# Patient Record
Sex: MALE | Race: BLACK OR AFRICAN AMERICAN | NOT HISPANIC OR LATINO | URBAN - METROPOLITAN AREA
[De-identification: names, ages, dates, MRNs, and addresses within clinical notes are randomized per-mention and may not be internally consistent; named-entity substitution may affect disease eponyms.]

---

## 2017-08-04 ENCOUNTER — HISTORICAL (OUTPATIENT)
Dept: LAB | Facility: HOSPITAL | Age: 72
End: 2017-08-04

## 2017-08-04 LAB
ABS NEUT (OLG): 3.2 X10(3)/MCL (ref 2.1–9.2)
ALBUMIN SERPL-MCNC: 3.6 GM/DL (ref 3.4–5)
ALBUMIN/GLOB SERPL: 0.9 RATIO (ref 1.1–2)
ALP SERPL-CCNC: 86 UNIT/L (ref 50–136)
ALT SERPL-CCNC: 31 UNIT/L (ref 12–78)
AST SERPL-CCNC: 26 UNIT/L (ref 15–37)
BASOPHILS # BLD AUTO: 0 X10(3)/MCL (ref 0–0.2)
BASOPHILS NFR BLD AUTO: 0 %
BILIRUB SERPL-MCNC: 0.5 MG/DL (ref 0.2–1)
BILIRUBIN DIRECT+TOT PNL SERPL-MCNC: 0.2 MG/DL (ref 0–0.5)
BILIRUBIN DIRECT+TOT PNL SERPL-MCNC: 0.3 MG/DL (ref 0–0.8)
BUN SERPL-MCNC: 16 MG/DL (ref 7–18)
CALCIUM SERPL-MCNC: 9.5 MG/DL (ref 8.5–10.1)
CHLORIDE SERPL-SCNC: 103 MMOL/L (ref 98–107)
CO2 SERPL-SCNC: 28 MMOL/L (ref 21–32)
CREAT SERPL-MCNC: 1.44 MG/DL (ref 0.7–1.3)
EOSINOPHIL # BLD AUTO: 0.1 X10(3)/MCL (ref 0–0.9)
EOSINOPHIL NFR BLD AUTO: 1 %
ERYTHROCYTE [DISTWIDTH] IN BLOOD BY AUTOMATED COUNT: 15.9 % (ref 11.5–17)
GLOBULIN SER-MCNC: 4.1 GM/DL (ref 2.4–3.5)
GLUCOSE SERPL-MCNC: 136 MG/DL (ref 74–106)
HCT VFR BLD AUTO: 51.9 % (ref 42–52)
HGB BLD-MCNC: 15.8 GM/DL (ref 14–18)
LYMPHOCYTES # BLD AUTO: 2.9 X10(3)/MCL (ref 0.6–4.6)
LYMPHOCYTES NFR BLD AUTO: 42 %
MAGNESIUM SERPL-MCNC: 2.3 MG/DL (ref 1.8–2.4)
MCH RBC QN AUTO: 25.2 PG (ref 27–31)
MCHC RBC AUTO-ENTMCNC: 30.4 GM/DL (ref 33–36)
MCV RBC AUTO: 82.6 FL (ref 80–94)
MONOCYTES # BLD AUTO: 0.6 X10(3)/MCL (ref 0.1–1.3)
MONOCYTES NFR BLD AUTO: 10 %
NEUTROPHILS # BLD AUTO: 3.2 X10(3)/MCL (ref 2.1–9.2)
NEUTROPHILS NFR BLD AUTO: 47 %
PLATELET # BLD AUTO: 236 X10(3)/MCL (ref 130–400)
PMV BLD AUTO: 10.6 FL (ref 9.4–12.4)
POTASSIUM SERPL-SCNC: 5 MMOL/L (ref 3.5–5.1)
PROT SERPL-MCNC: 7.7 GM/DL (ref 6.4–8.2)
RBC # BLD AUTO: 6.28 X10(6)/MCL (ref 4.7–6.1)
SODIUM SERPL-SCNC: 143 MMOL/L (ref 136–145)
T4 FREE SERPL-MCNC: 1.1 NG/DL (ref 0.76–1.46)
TSH SERPL-ACNC: 2.29 MIU/ML (ref 0.36–3.74)
WBC # SPEC AUTO: 6.9 X10(3)/MCL (ref 4.5–11.5)

## 2017-08-07 ENCOUNTER — HISTORICAL (OUTPATIENT)
Dept: ADMINISTRATIVE | Facility: HOSPITAL | Age: 72
End: 2017-08-07

## 2017-09-29 ENCOUNTER — HISTORICAL (OUTPATIENT)
Dept: LAB | Facility: HOSPITAL | Age: 72
End: 2017-09-29

## 2017-09-29 LAB
ABS NEUT (OLG): 3.1 X10(3)/MCL (ref 2.1–9.2)
APTT PPP: 34.6 SECOND(S) (ref 24.8–36.9)
BASOPHILS # BLD AUTO: 0 X10(3)/MCL (ref 0–0.2)
BASOPHILS NFR BLD AUTO: 0 %
BUN SERPL-MCNC: 19 MG/DL (ref 7–18)
CALCIUM SERPL-MCNC: 9.3 MG/DL (ref 8.5–10.1)
CHLORIDE SERPL-SCNC: 102 MMOL/L (ref 98–107)
CO2 SERPL-SCNC: 33 MMOL/L (ref 21–32)
CREAT SERPL-MCNC: 1.37 MG/DL (ref 0.7–1.3)
EOSINOPHIL # BLD AUTO: 0.1 X10(3)/MCL (ref 0–0.9)
EOSINOPHIL NFR BLD AUTO: 1 %
ERYTHROCYTE [DISTWIDTH] IN BLOOD BY AUTOMATED COUNT: 15.9 % (ref 11.5–17)
GLUCOSE SERPL-MCNC: 89 MG/DL (ref 74–106)
HCT VFR BLD AUTO: 47.7 % (ref 42–52)
HGB BLD-MCNC: 15.2 GM/DL (ref 14–18)
INR PPP: 1.15 (ref 0–1.27)
LYMPHOCYTES # BLD AUTO: 2.9 X10(3)/MCL (ref 0.6–4.6)
LYMPHOCYTES NFR BLD AUTO: 42 %
MCH RBC QN AUTO: 26.9 PG (ref 27–31)
MCHC RBC AUTO-ENTMCNC: 31.9 GM/DL (ref 33–36)
MCV RBC AUTO: 84.3 FL (ref 80–94)
MONOCYTES # BLD AUTO: 0.8 X10(3)/MCL (ref 0.1–1.3)
MONOCYTES NFR BLD AUTO: 11 %
NEUTROPHILS # BLD AUTO: 3.1 X10(3)/MCL (ref 1.4–7.9)
NEUTROPHILS NFR BLD AUTO: 45 %
PLATELET # BLD AUTO: 230 X10(3)/MCL (ref 130–400)
PMV BLD AUTO: 9.7 FL (ref 9.4–12.4)
POTASSIUM SERPL-SCNC: 4.5 MMOL/L (ref 3.5–5.1)
PROTHROMBIN TIME: 14.5 SECOND(S) (ref 12.2–14.7)
RBC # BLD AUTO: 5.66 X10(6)/MCL (ref 4.7–6.1)
SODIUM SERPL-SCNC: 140 MMOL/L (ref 136–145)
WBC # SPEC AUTO: 6.9 X10(3)/MCL (ref 4.5–11.5)

## 2017-10-02 ENCOUNTER — HISTORICAL (OUTPATIENT)
Dept: CARDIOLOGY | Facility: HOSPITAL | Age: 72
End: 2017-10-02

## 2018-12-03 ENCOUNTER — HOSPITAL ENCOUNTER (OUTPATIENT)
Dept: MEDSURG UNIT | Facility: HOSPITAL | Age: 73
End: 2018-12-04
Attending: INTERNAL MEDICINE | Admitting: INTERNAL MEDICINE

## 2018-12-03 LAB
ABS NEUT (OLG): 3.5 X10(3)/MCL (ref 2.1–9.2)
APTT PPP: 40.5 SECOND(S) (ref 24.8–36.9)
BASOPHILS # BLD AUTO: 0 X10(3)/MCL (ref 0–0.2)
BASOPHILS NFR BLD AUTO: 0 %
BUN SERPL-MCNC: 14 MG/DL (ref 7–18)
CALCIUM SERPL-MCNC: 9.2 MG/DL (ref 8.5–10.1)
CHLORIDE SERPL-SCNC: 103 MMOL/L (ref 98–107)
CO2 SERPL-SCNC: 31 MMOL/L (ref 21–32)
CREAT SERPL-MCNC: 1.32 MG/DL (ref 0.7–1.3)
CREAT/UREA NIT SERPL: 10.6
EOSINOPHIL # BLD AUTO: 0.2 X10(3)/MCL (ref 0–0.9)
EOSINOPHIL NFR BLD AUTO: 2 %
ERYTHROCYTE [DISTWIDTH] IN BLOOD BY AUTOMATED COUNT: 14.4 % (ref 11.5–17)
GLUCOSE SERPL-MCNC: 84 MG/DL (ref 74–106)
HCT VFR BLD AUTO: 39.7 % (ref 42–52)
HGB BLD-MCNC: 12.1 GM/DL (ref 14–18)
INR PPP: 1 (ref 0–1.3)
LYMPHOCYTES # BLD AUTO: 3.7 X10(3)/MCL (ref 0.6–4.6)
LYMPHOCYTES NFR BLD AUTO: 45 %
MAGNESIUM SERPL-MCNC: 2.2 MG/DL (ref 1.8–2.4)
MCH RBC QN AUTO: 26.4 PG (ref 27–31)
MCHC RBC AUTO-ENTMCNC: 30.5 GM/DL (ref 33–36)
MCV RBC AUTO: 86.7 FL (ref 80–94)
MONOCYTES # BLD AUTO: 0.8 X10(3)/MCL (ref 0.1–1.3)
MONOCYTES NFR BLD AUTO: 9 %
NEUTROPHILS # BLD AUTO: 3.5 X10(3)/MCL (ref 2.1–9.2)
NEUTROPHILS NFR BLD AUTO: 42 %
PLATELET # BLD AUTO: 229 X10(3)/MCL (ref 130–400)
PMV BLD AUTO: 10 FL (ref 9.4–12.4)
POTASSIUM SERPL-SCNC: 5.1 MMOL/L (ref 3.5–5.1)
PROTHROMBIN TIME: 13.4 SECOND(S) (ref 12.2–14.7)
RBC # BLD AUTO: 4.58 X10(6)/MCL (ref 4.7–6.1)
SODIUM SERPL-SCNC: 142 MMOL/L (ref 136–145)
WBC # SPEC AUTO: 8.2 X10(3)/MCL (ref 4.5–11.5)

## 2018-12-04 LAB
ABS NEUT (OLG): 6.45 X10(3)/MCL (ref 2.1–9.2)
ALBUMIN SERPL-MCNC: 3.2 GM/DL (ref 3.4–5)
ALBUMIN/GLOB SERPL: 1.1 RATIO (ref 1.1–2)
ALP SERPL-CCNC: 55 UNIT/L (ref 50–136)
ALT SERPL-CCNC: 29 UNIT/L (ref 12–78)
AST SERPL-CCNC: 45 UNIT/L (ref 15–37)
BASOPHILS # BLD AUTO: 0 X10(3)/MCL (ref 0–0.2)
BASOPHILS NFR BLD AUTO: 0 %
BILIRUB SERPL-MCNC: 0.3 MG/DL (ref 0.2–1)
BILIRUBIN DIRECT+TOT PNL SERPL-MCNC: 0.1 MG/DL (ref 0–0.5)
BILIRUBIN DIRECT+TOT PNL SERPL-MCNC: 0.2 MG/DL (ref 0–0.8)
BUN SERPL-MCNC: 16 MG/DL (ref 7–18)
CALCIUM SERPL-MCNC: 7.7 MG/DL (ref 8.5–10.1)
CHLORIDE SERPL-SCNC: 104 MMOL/L (ref 98–107)
CO2 SERPL-SCNC: 31 MMOL/L (ref 21–32)
CREAT SERPL-MCNC: 1.37 MG/DL (ref 0.7–1.3)
EOSINOPHIL # BLD AUTO: 0 X10(3)/MCL (ref 0–0.9)
EOSINOPHIL NFR BLD AUTO: 0 %
ERYTHROCYTE [DISTWIDTH] IN BLOOD BY AUTOMATED COUNT: 14.5 % (ref 11.5–17)
GLOBULIN SER-MCNC: 3 GM/DL (ref 2.4–3.5)
GLUCOSE SERPL-MCNC: 102 MG/DL (ref 74–106)
HCT VFR BLD AUTO: 35 % (ref 42–52)
HGB BLD-MCNC: 11 GM/DL (ref 14–18)
LYMPHOCYTES # BLD AUTO: 2 X10(3)/MCL (ref 0.6–4.6)
LYMPHOCYTES NFR BLD AUTO: 21 %
MCH RBC QN AUTO: 26.4 PG (ref 27–31)
MCHC RBC AUTO-ENTMCNC: 31.4 GM/DL (ref 33–36)
MCV RBC AUTO: 83.9 FL (ref 80–94)
MONOCYTES # BLD AUTO: 1 X10(3)/MCL (ref 0.1–1.3)
MONOCYTES NFR BLD AUTO: 10 %
NEUTROPHILS # BLD AUTO: 6.45 X10(3)/MCL (ref 2.1–9.2)
NEUTROPHILS NFR BLD AUTO: 68 %
PLATELET # BLD AUTO: 200 X10(3)/MCL (ref 130–400)
PMV BLD AUTO: 9.9 FL (ref 9.4–12.4)
POTASSIUM SERPL-SCNC: 4.4 MMOL/L (ref 3.5–5.1)
PROT SERPL-MCNC: 6.2 GM/DL (ref 6.4–8.2)
RBC # BLD AUTO: 4.17 X10(6)/MCL (ref 4.7–6.1)
SODIUM SERPL-SCNC: 140 MMOL/L (ref 136–145)
WBC # SPEC AUTO: 9.5 X10(3)/MCL (ref 4.5–11.5)

## 2019-07-23 ENCOUNTER — HISTORICAL (OUTPATIENT)
Dept: ADMINISTRATIVE | Facility: HOSPITAL | Age: 74
End: 2019-07-23

## 2019-07-29 LAB
FINAL CULTURE: NORMAL
FINAL CULTURE: NORMAL

## 2019-09-01 ENCOUNTER — HOSPITAL ENCOUNTER (OUTPATIENT)
Dept: TELEMEDICINE | Facility: HOSPITAL | Age: 74
Discharge: HOME | End: 2019-09-01
Payer: MEDICARE

## 2019-09-01 DIAGNOSIS — G45.9 TIA DUE TO EMBOLISM: ICD-10-CM

## 2019-09-01 DIAGNOSIS — I74.9 TIA DUE TO EMBOLISM: ICD-10-CM

## 2019-09-01 PROCEDURE — G0425 INPT/ED TELECONSULT30: HCPCS | Mod: GT,G0,, | Performed by: PSYCHIATRY & NEUROLOGY

## 2019-09-01 PROCEDURE — G0425 PR INPT TELEHEALTH CONSULT 30M: ICD-10-PCS | Mod: GT,G0,, | Performed by: PSYCHIATRY & NEUROLOGY

## 2019-09-01 NOTE — HPI
74 year old presents with transient hemiparesis and aphasia. Now resolved  PM Hx HTN. CHF, AICD, AFIB. Exam and CT head normal

## 2019-09-01 NOTE — SUBJECTIVE & OBJECTIVE
Woke up with symptoms?: no    Recent bleeding noted: no  Does the patient take any Blood Thinners? yes  Medications: Anticoagulants:  apixaban/Eliquis      Past Medical History: hypertension, CHF and Afib    Past Surgical History: no major surgeries within the last 2 weeks    Family History: MI/CAD and stroke    Social History: no smoking, no drinking, no drugs    Allergies: Allergies have not been reviewed No relevant allergies    Review of Systems   Neurological: Positive for speech difficulty, weakness and numbness.   All other systems reviewed and are negative.    Objective:        CT READ: Yes  No hemmorhage. No mass effect. No early infarct signs.     Physical Exam   Constitutional: He appears well-developed.   HENT:   Head: Normocephalic.   Eyes: Pupils are equal, round, and reactive to light.   Neck: Normal range of motion.   Cardiovascular: Normal rate.   Pulmonary/Chest: Effort normal.   Abdominal: Soft.   Musculoskeletal: Normal range of motion.   Neurological: He is alert.   Skin: Skin is warm.

## 2019-09-01 NOTE — ASSESSMENT & PLAN NOTE
SEE HPI    TIA emblic to L MCA    Antithrombotics for secondary stroke prevention: Anticoagulants: Apixaban 5 mg BID     Statins for secondary stroke prevention and hyperlipidemia, if present:   Statins: Atorvastatin- 80 mg daily    Aggressive risk factor modification: HTN, A-Fib     Rehab efforts: The patient has been evaluated by a stroke team provider and the therapy needs have been fully considered based off the presenting complaints and exam findings. The following therapy evaluations are needed: None    Diagnostics ordered/pending: Carotid ultrasound to assess vasculature, TTE to assess cardiac function/status     VTE prophylaxis: None: Reason for No Pharmacological VTE Prophylaxis: Currently on anticoagulation    BP parameters: TIA: SBP <220 until imaging confirmation of no infarct

## 2019-09-01 NOTE — CONSULTS
[facility]  Vascular Neurology  Comprehensive Stroke Center  Tele-Consultation Note      Consults    Consulting Provider: CRISTO REYES  Current Providers  No providers found    Patient [facility]  Spoke hospital nurse at bedside with patient assisting consultant.     Patient information was obtained from patient.         Assessment/Plan:     STROKE DOCUMENTATION     Acute Stroke Times:   Acute Stroke Times   Last Known Normal Date: 09/01/19  Last Known Normal Time: 0615  Stroke Team Arrival Date: 09/01/19  Stroke Team Arrival Time: 0818  CT Interpretation Time: 0825    NIH Scale:  1a. Level of Consciousness: 0-->Alert, keenly responsive  1b. LOC Questions: 0-->Answers both questions correctly  1c. LOC Commands: 0-->Performs both tasks correctly  2. Best Gaze: 0-->Normal  3. Visual: 0-->No visual loss  4. Facial Palsy: 0-->Normal symmetrical movements  5a. Motor Arm, Left: 0-->No drift, limb holds 90 (or 45) degrees for full 10 secs  5b. Motor Arm, Right: 0-->No drift, limb holds 90 (or 45) degrees for full 10 secs  6a. Motor Leg, Left: 0-->No drift, leg holds 30 degree position for full 5 secs  6b. Motor Leg, Right: 0-->No drift, leg holds 30 degree position for full 5 secs  7. Limb Ataxia: 0-->Absent  8. Sensory: 0-->Normal, no sensory loss  9. Best Language: 0-->No aphasia, normal  10. Dysarthria: 0-->Normal  11. Extinction and Inattention (formerly Neglect): 0-->No abnormality  Total (NIH Stroke Scale): 0     Modified [place]    Matt Coma Scale:    ABCD2 Score:    [id number] Score:   HAS -BLED Score:   ICH Score:   Hunt & Cramer Classification:       Diagnoses:   TIA due to embolism  SEE HPI    TIA emblic to L MCA    Antithrombotics for secondary stroke prevention: Anticoagulants: Apixaban 5 mg BID     Statins for secondary stroke prevention and hyperlipidemia, if present:   Statins:  Atorvastatin- 80 mg daily    Aggressive risk factor modification: HTN, A-Fib     Rehab efforts: The patient has been evaluated by a stroke team provider and the therapy needs have been fully considered based off the presenting complaints and exam findings. The following therapy evaluations are needed: None    Diagnostics ordered/pending: Carotid ultrasound to assess vasculature, TTE to assess cardiac function/status     VTE prophylaxis: None: Reason for No Pharmacological VTE Prophylaxis: Currently on anticoagulation    BP parameters: TIA: SBP <220 until imaging confirmation of no infarct             There were no vitals taken for this visit.  Alteplase Eligible?: No  Alteplase Recommendation: Alteplase not recommended due to Symptoms resolved   Possible Interventional Revascularization Candidate? No; No significant neurological deficit    Disposition Recommendation: admit to inpatient  do not transfer    Subjective:     History of Present Illness:  74 year old presents with transient hemiparesis and aphasia. Now resolved  PM Hx HTN. CHF, AICD, AFIB. Exam and CT head normal      Woke up with symptoms?: no    Recent bleeding noted: no  Does the patient take any Blood Thinners? yes  Medications: Anticoagulants:  apixaban/Eliquis      Past Medical History: hypertension, CHF and Afib    Past Surgical History: no major surgeries within the last 2 weeks    Family History: MI/CAD and stroke    Social History: no smoking, no drinking, no drugs    Allergies: Allergies have not been reviewed No relevant allergies    Review of Systems   Neurological: Positive for speech difficulty, weakness and numbness.   All other systems reviewed and are negative.    Objective:        CT READ: Yes  No hemmorhage. No mass effect. No early infarct signs.     Physical Exam   Constitutional: He appears well-developed.   HENT:   Head: Normocephalic.   Eyes: Pupils are equal, round, and reactive to light.   Neck: Normal range of motion.    Cardiovascular: Normal rate.   Pulmonary/Chest: Effort normal.   Abdominal: Soft.   Musculoskeletal: Normal range of motion.   Neurological: He is alert.   Skin: Skin is warm.             Recommended the emergency room physician to have a brief discussion with the patient and/or family if available regarding the risks and benefits of treatment, and to briefly document the occurrence of that discussion in his clinical encounter note.     The attending portion of this evaluation, treatment, and documentation was performed per Kwabena Street MD via audiovisual.    Billing code:  (non-intervention mild to moderate stroke, TIA, some mimics)    · This patient has a critical neurological condition/illness, with some potential for high morbidity and mortality.  · There is a moderate probability for acute neurological change leading to clinical and possibly life-threatening deterioration requiring highest level of physician preparedness for urgent intervention.  · Care was coordinated with other physicians involved in the patient's care.  · Radiologic studies and laboratory data were reviewed and interpreted, and plan of care was re-assessed based on the results.  · Diagnosis, treatment options and prognosis may have been discussed with the patient and/or family members or caregiver.      In your opinion, this was a: Tier 1 Van Positive    Consult End Time: 8:33 AM     Kwabena Street MD  Comprehensive Stroke Center  Vascular Neurology   [facility]

## 2022-04-29 NOTE — OP NOTE
Patient:   Darell Hassan            MRN: [id number]            FIN: [id number]               Age:   72 years     Sex:  Male     :  1945   Associated Diagnoses:   None   Author:   Puneet Larson MD      Cardiologist: Dr. Puneet Larson    Assistant: _    Preoperative Diagnosis(es):  [ X ] Artial fibrillation  [ _ ] Artial flutter  [ _ ] Artial tachycardia    Postoperative Diagnosis(es):  [ X ] Normal Sinus rhythm  [ _ ] Sinus bradycardia  [ _ ] Artial fibrillation  [ _ ] Sinus with third-degree atrioventricular block  [ _ ] A-V sequential pacing/capture    Procedure(s):  Electrical cardioversion    Complications:  None    Description of Procedure:  After informed consent was obtained and permit signed, the patient was transported to the Cardiac Electrophysiology Laboratory. The EKG electrodes and cardioverter / defibrillator pads were applied to the patient. Pulse oximetry and ventilation were monitored, with oxygen and ventilation assistance given as needed. Adequate sedation for the procedure was accomplished by [X]the Anesthsia service/[_] IV Versend and Fentanyl. Synchronized direct-current energy was delivered at a level of 200 joules biphasic. Additional energy was [X] not indicated/[_] indicated at _ joules biphasic. Sinus rhythm[X]was/[_] was not restored. Upon awakening, the patient was transferred in a stable condition to a monitored bed for recovery.

## 2022-04-29 NOTE — OP NOTE
Patient:   Darell Hassan            MRN: [id number]            FIN: [id number]               Age:   73 years     Sex:  Male     :  1945   Associated Diagnoses:   None   Author:   Puneet Larson MD      Operative Note   Operative Information   Date/ Time:  12/3/2018 15:34:00.     Procedures Performed:   1.  Comprehensive EP study with attempted induction of arrhythmia  2.  Left atrial pacing and recording from coronary sinus catheter  3.  Programmed stimulation and pacing after IV drug infusion  4.  Pulmonary vein isolation and substrate modification for persistent atrial for ablation  5.  Empiric ablation of the caval tricuspid isthmus for typical appearing atrial flutter  6.  Intracardiac echo  7.  Transseptal puncture  8.  3D mapping  .     Indications:   73-year-old black male with history of persistent atrial fibrillation as well as typical appearing atrial flutter who is undergoing EP study and ablation for attempted definitive therapy.  .     Preoperative Diagnosis:   1.  Persistent atrial fibrillation  2.  Typical appearing atrial flutter  .     Postoperative Diagnosis:     1.  Persistent atrial fibrillation  2.  Typical appearing atrial flutter  .     Surgeon: Puneet Larson MD.     Esimated blood loss: loss less than  10  cc.     Description of Procedure/Findings/    Complications:   Summary of procedure:  After risks, benefits, and alternatives were explained to the patient, informed consent was obtained.  The patient was brought to the EP lab in a fasting nonsedated state.  Sedation for the procedure was accomplished by the anesthesia team.  The bilateral groins were prepped and draped in usual sterile fashion.  Using 1% lidocaine the bilateral groins were anesthetized.  Using the modified Seldinger technique access was obtained to the right femoral vein on 3 separate occasions where a 7 Cuban sheath, a 8 Cuban sheath, and the SLO sheath were placed.  Again using the modified Seldinger  technique access was obtained to left femoral vein on a single occasion where a long 10 Macanese sheath was placed.  I then advanced a [employer] decapolar CS catheter through the 7 Macanese sheath and placed into the coronary sinus for left atrial pacing and recording.  A CRD 2 catheter was advanced through the 8 Macanese sheath and placed in the His bundle region for His bundle recording.  Baseline measurements were then obtained.  I then advanced a [place] transseptal needle through the SLO sheath and dilator and under fluoroscopic and intracardiac echo guidance a single transseptal puncture was performed with a single radiofrequency impulse.  Successful puncture was confirmed on intracardiac echo.  The SLO sheath was advanced over the needle and dilator into left atrium and the needle and dilator were removed from the body.  A left atrial mean pressure of 16 mmHg was measured.    Of note, after all venous access had been obtained the patient received a 5000 unit heparin bolus.  After successful transseptal puncture the patient received a 6000 unit heparin bolus and was started on heparin drip.  Heparin was titrated throughout the procedure for a goal ACT of 300-400 seconds.    A [place] pigtail wire was advanced into left atrium and the SLO sheath was exchanged for a short 8 Macanese sheath.  The CRD 2 catheter was removed the body and the previously placed 8 Macanese sheath was exchanged for a SLO sheath.  Again under fluoroscopic and intracardiac echo guidance a second transseptal puncture was performed again using a single radiofrequency impulse.  Successful puncture was confirmed on intracardiac echo.  The SLO sheath was advanced over the needle and dilator into the left atrium and the needle and dilator were removed from the body.  The short 8 Macanese sheath was then exchanged for a Agilis searable  sheath.  A circular mapping catheter was then advanced through the SLO sheath into the left atrium and anatomy and  voltage mapping was created of the left atrium, pulmonary veins, and left atrial appendage.  The mapping catheter was then placed into the left superior pulmonary vein.  I then advanced a [place] Tachy-catheter through the steerable sheath into the left atrium.  I then proceeded with wide area circumferential ablation of the left-sided pulmonary veins.  Due to continued conduction with pacing from the proximal coronary sinus into the left superior pulmonary vein I decided with ablation within the gume of the 2 left-sided veins.  When ablating in the gume isolation of the left superior pulmonary vein was achieved.  The mapping catheter was then advanced into the left inferior pulmonary vein which demonstrated continued conduction.  With further ablation within the gume and more proximal aspect to the left inferior pulmonary vein, isolation was achieved.    The circular mapping catheter was then directed to the right superior pulmonary vein.  I then proceeded with wide area circumferential ablation of the right-sided pulmonary veins.  Once the right superior pulmonary vein was isolated, the circular mapping catheter was then directed into the right inferior pulmonary vein which was also found to be isolated.    With the circular mapping catheter in the right inferior pulmonary vein the patient was then given 18 mg of IV adenosine to hyper-depolarize the atrial tissue and evaluate for reconnection of the pulmonary vein.  No reconnection was found.  This adenosine challenge was also performed for the other 3 pulmonary veins which remained isolated.  I then directed the circular mapping catheter to the left atrial posterior wall and proceeded with isolation of the left atrial posterior wall for further substrate modification of the left atrium.  Once the posterior wall was isolated the circular mapping catheter and SLO sheath were withdrawn into the inferior vena cava.  The circular mapping catheter was then  exchanged for a duo decapolar Halo catheter which was advanced into the right atrium and placed along the neelam terminalis for right atrial pacing and recording.  The steerable sheath and ablation catheter were then withdrawn into the inferior vena cava.  The heparin drip was then discontinued.  I then performed pacing from the proximal coronary sinus and distal halo catheters to evaluate for conduction across the isthmus.    The ablation catheter was then placed at the distal aspect of the caval tricuspid isthmus in the 6 o'clock position and while performing pacing from the proximal coronary sinus, ablation through the isthmus was performed.  When ablating at the proximal aspect of the isthmus block was achieved.  This line was completed to the inferior vena cava.  The ablation catheter was placed back at the distal aspect of this previous line and this line was traced with the ablation catheter performing ablation in areas where there were adequate atrial signals.    Bidirectional block was then confirmed.  The ablation catheter was then exchanged for the CRD 2 catheter which was placed back in the His bundle region.  Post ablation baseline measurements were obtained.  I then performed the post ablation atrial study with atrial burst pacing, decremental pacing, and extra stimuli.  After 20 minutes from my last ablation lesion the patient was again given 18 mg of IV adenosine to hyper-depolarize the atrial tissue and evaluate for continued bidirectional block.  Bidirectional block persisted without reconnection of conduction through the isthmus.    The intracardiac echo catheter was then advanced into the right ventricle to visualize for pericardial effusion.  There is no significant pericardial effusion found.  The catheters were then removed from the body and the sheaths were flushed.  The SLO sheath and the steerable sheath were exchanged for short 9 Chinese sheath.  The long 10 Chinese sheath was exchanged for  a short 10 Indonesian sheath.  The patient was then given 60 mg of IV protamine for reversal of heparinization.    The patient be transferred to the postanesthesia care unit where the sheath will be removed and hemostasis will be obtained with manual compression once the ACT is less than 180 seconds.    3D mapping was used throughout the procedure to create anatomy of the left atrium, pulmonary veins, and left atrial appendage as well as to blas the location of the catheters as well as ablation lesions.    Baseline measurements:  Sinus node: The sinus cycle length was 1055 ms  AV node: The KY interval was 207 ms, the AH interval was 152 ms, the HV interval 58 ms  Ventricle: QRS duration was 92 ms and the QT was 422 ms    Ablation: I performed a total number of 175 ablation lesions for total time of 70 minutes and 56 seconds the average temperature was 31 °C and average power was 20 W.  Ablation lesions 1 through 135 were used to perform pulmonary vein isolation.  Ablation lesions #135 through 150 were used to perform ablation of the left atrial posterior wall with decreased power output of 20 W.  Ablation lesions #151 through 175 were used to perform ablation of the caval tricuspid isthmus.    Post ablation measurements:  Sinus node: The patient was being atrially paced at 1000 ms.  AV node: The KY interval was 235 ms, the AH interval was 102 ms, the HV interval was 60 ms  Ventricle: QRS duration was 86 ms and the QT was 432 ms    Atrial study: The AV block cycle length was found at 480 ms.  The AV node ERP was found at 370 ms of a drive train of 600 ms.  The atrial ERP was found at 220 ms after drive train of 600 ms.    Trans-isthmus conduction: The pre-ablation trans-isthmus conduction time with pacing from the proximal coronary sinus to the distal Halo catheter and with pacing from the distal Halo catheter to the proximal coronary sinus was 105 ms.  The post ablation trans-isthmus conduction time with pacing from the  proximal coronary sinus to the distal Halo catheter and with pacing from the distal Halo catheter to the proximal coronary sinus was 135 ms.  There was positive differential pacing as when I paced from halo electrodes 3, 4 the trans-isthmus conduction time to the proximal coronary sinus was 120 ms.    Impression:  1.  Successful pulmonary vein isolation with left atrial substrate modification for persistent atrial fibrillation  2.  Successful ablation of the caval tricuspid isthmus with achievement of bidirectional block for typical appearing atrial flutter    Plan:  We will monitor patient overnight for complications from the procedure.  He will receive half dose Lovenox 8 hours after hemostasis obtained.  I will attempt to stop antiarrhythmic drug therapy.  We will attempt to restart full anti-correlation therapy tomorrow morning.  .     Complications: None.

## 2023-09-18 ENCOUNTER — ANESTHESIA EVENT (OUTPATIENT)
Dept: ENDOSCOPY | Facility: HOSPITAL | Age: 78
End: 2023-09-18
Payer: MEDICARE

## 2023-09-18 RX ORDER — ONDANSETRON 2 MG/ML
4 INJECTION INTRAMUSCULAR; INTRAVENOUS ONCE AS NEEDED
Status: CANCELLED | OUTPATIENT
Start: 2023-09-18 | End: 2035-02-14

## 2023-09-18 NOTE — ANESTHESIA PREPROCEDURE EVALUATION
09/18/2023  Darell Hassan is a 78 y.o., male , who presents for the following:    Procedure: COLON (Abdomen)   Anesthesia type: General/MAC   Diagnosis: Personal history of colonic polyps [Z86.010]   Location: [facility] ENDO 02 / [facility] ENDOSCOPY   Surgeons: Duy Aguirre MD      TTE (2022) :   - LVEF 60% / mild concentric LVH   - MASOOD   - AV sclerosis w/o stenosis   - Mod TR / trace MR   - PASP 48 mmHg    Nuclear PET (3/18/21) :    - Rest EF 52%   - Scan suggests Moderate Risks for future CV events    Pre-op Assessment    I have reviewed the Patient Summary Reports.     I have reviewed the Nursing Notes. I have reviewed the NPO Status.   I have reviewed the Medications.     Review of Systems  Anesthesia Hx:  No problems with previous Anesthesia  Denies Family Hx of Anesthesia complications.   Denies Personal Hx of Anesthesia complications.   Cardiovascular:   NICM, w/ AICD  A. Fib / PAF  Moderate TR  Pulm HTN, PASP 48 mmHG  >4 METS activity   Neurological:   TIA,    Endocrine:   Diabetes, type 2        Physical Exam  General: Alert and Oriented    Airway:  Mallampati: II   Mouth Opening: Normal  TM Distance: Normal  Tongue: Normal  Neck ROM: Normal ROM    Chest/Lungs:  Normal Respiratory Rate    Heart:  Rate: Normal  Rhythm: Regular Rhythm        Anesthesia Plan  Type of Anesthesia, risks & benefits discussed:    Anesthesia Type: Gen Natural Airway  Intra-op Monitoring Plan: Standard ASA Monitors  Post Op Pain Control Plan: IV/PO Opioids PRN  Induction:  IV  Airway Plan: Direct  Informed Consent: Informed consent signed with the Patient and all parties understand the risks and agree with anesthesia plan.  All questions answered. Patient consented to blood products? No  ASA Score: 3  Day of Surgery Review of History & Physical: H&P Update referred to the surgeon/provider.  Anesthesia Plan  Notes: Nasal cannula vs facemask supplemental oxygenation   For patients with GORDO/obesity, may consider SuperNoval Nasal CPAP      Ready For Surgery From Anesthesia Perspective.     .

## 2023-09-19 ENCOUNTER — ANESTHESIA (OUTPATIENT)
Dept: ENDOSCOPY | Facility: HOSPITAL | Age: 78
End: 2023-09-19
Payer: MEDICARE

## 2023-09-19 ENCOUNTER — HOSPITAL ENCOUNTER (OUTPATIENT)
Facility: HOSPITAL | Age: 78
Discharge: HOME | End: 2023-09-19
Attending: INTERNAL MEDICINE | Admitting: INTERNAL MEDICINE
Payer: MEDICARE

## 2023-09-19 DIAGNOSIS — Z86.010 PERSONAL HISTORY OF COLONIC POLYPS: ICD-10-CM

## 2023-09-19 LAB — POCT GLUCOSE: 81 MG/DL (ref 70–110)

## 2023-09-19 PROCEDURE — D9220A PRA ANESTHESIA: Mod: PT,ANES,, | Performed by: ANESTHESIOLOGY

## 2023-09-19 PROCEDURE — 25000003 PHARM REV CODE 250: Performed by: NURSE ANESTHETIST, CERTIFIED REGISTERED

## 2023-09-19 PROCEDURE — 82962 GLUCOSE BLOOD TEST: CPT | Performed by: INTERNAL MEDICINE

## 2023-09-19 PROCEDURE — 25000003 PHARM REV CODE 250: Performed by: ANESTHESIOLOGY

## 2023-09-19 PROCEDURE — 88305 TISSUE EXAM BY PATHOLOGIST: CPT | Performed by: INTERNAL MEDICINE

## 2023-09-19 PROCEDURE — D9220A PRA ANESTHESIA: Mod: PT,CRNA,, | Performed by: NURSE ANESTHETIST, CERTIFIED REGISTERED

## 2023-09-19 PROCEDURE — D9220A PRA ANESTHESIA: ICD-10-PCS | Mod: PT,CRNA,, | Performed by: NURSE ANESTHETIST, CERTIFIED REGISTERED

## 2023-09-19 PROCEDURE — 37000008 HC ANESTHESIA 1ST 15 MINUTES: Performed by: INTERNAL MEDICINE

## 2023-09-19 PROCEDURE — 45385 COLONOSCOPY W/LESION REMOVAL: CPT | Mod: PT | Performed by: INTERNAL MEDICINE

## 2023-09-19 PROCEDURE — 37000009 HC ANESTHESIA EA ADD 15 MINS: Performed by: INTERNAL MEDICINE

## 2023-09-19 PROCEDURE — D9220A PRA ANESTHESIA: ICD-10-PCS | Mod: PT,ANES,, | Performed by: ANESTHESIOLOGY

## 2023-09-19 PROCEDURE — 27201423 OPTIME MED/SURG SUP & DEVICES STERILE SUPPLY: Performed by: INTERNAL MEDICINE

## 2023-09-19 PROCEDURE — 63600175 PHARM REV CODE 636 W HCPCS: Performed by: NURSE ANESTHETIST, CERTIFIED REGISTERED

## 2023-09-19 RX ORDER — METFORMIN HYDROCHLORIDE 1000 MG/1
1000 TABLET ORAL 2 TIMES DAILY WITH MEALS
COMMUNITY

## 2023-09-19 RX ORDER — LIDOCAINE HYDROCHLORIDE 10 MG/ML
1 INJECTION, SOLUTION EPIDURAL; INFILTRATION; INTRACAUDAL; PERINEURAL ONCE
Status: DISCONTINUED | OUTPATIENT
Start: 2023-09-19 | End: 2023-09-19 | Stop reason: HOSPADM

## 2023-09-19 RX ORDER — HYDRALAZINE HYDROCHLORIDE 50 MG/1
50 TABLET, FILM COATED ORAL 3 TIMES DAILY
COMMUNITY

## 2023-09-19 RX ORDER — SOTALOL HYDROCHLORIDE 80 MG/1
40 TABLET ORAL 2 TIMES DAILY
COMMUNITY

## 2023-09-19 RX ORDER — PROPOFOL 10 MG/ML
VIAL (ML) INTRAVENOUS
Status: COMPLETED
Start: 2023-09-19 | End: 2023-09-19

## 2023-09-19 RX ORDER — ATORVASTATIN CALCIUM 80 MG/1
80 TABLET, FILM COATED ORAL DAILY
COMMUNITY

## 2023-09-19 RX ORDER — PHENYLEPHRINE HYDROCHLORIDE 10 MG/ML
INJECTION INTRAVENOUS
Status: DISCONTINUED | OUTPATIENT
Start: 2023-09-19 | End: 2023-09-19

## 2023-09-19 RX ORDER — EPHEDRINE SULFATE 50 MG/ML
INJECTION, SOLUTION INTRAVENOUS
Status: DISCONTINUED | OUTPATIENT
Start: 2023-09-19 | End: 2023-09-19

## 2023-09-19 RX ORDER — LIDOCAINE HYDROCHLORIDE 20 MG/ML
INJECTION, SOLUTION EPIDURAL; INFILTRATION; INTRACAUDAL; PERINEURAL
Status: DISCONTINUED
Start: 2023-09-19 | End: 2023-09-19 | Stop reason: HOSPADM

## 2023-09-19 RX ORDER — PROPOFOL 10 MG/ML
VIAL (ML) INTRAVENOUS
Status: DISCONTINUED | OUTPATIENT
Start: 2023-09-19 | End: 2023-09-19

## 2023-09-19 RX ORDER — AMLODIPINE BESYLATE 5 MG/1
5 TABLET ORAL DAILY
COMMUNITY

## 2023-09-19 RX ORDER — SODIUM CHLORIDE, SODIUM GLUCONATE, SODIUM ACETATE, POTASSIUM CHLORIDE AND MAGNESIUM CHLORIDE 30; 37; 368; 526; 502 MG/100ML; MG/100ML; MG/100ML; MG/100ML; MG/100ML
INJECTION, SOLUTION INTRAVENOUS CONTINUOUS
Status: DISCONTINUED | OUTPATIENT
Start: 2023-09-19 | End: 2023-09-19 | Stop reason: HOSPADM

## 2023-09-19 RX ORDER — SODIUM CHLORIDE, SODIUM LACTATE, POTASSIUM CHLORIDE, CALCIUM CHLORIDE 600; 310; 30; 20 MG/100ML; MG/100ML; MG/100ML; MG/100ML
INJECTION, SOLUTION INTRAVENOUS CONTINUOUS
Status: DISCONTINUED | OUTPATIENT
Start: 2023-09-19 | End: 2023-09-19 | Stop reason: HOSPADM

## 2023-09-19 RX ORDER — LISINOPRIL 40 MG/1
40 TABLET ORAL DAILY
COMMUNITY

## 2023-09-19 RX ORDER — PROPOFOL 10 MG/ML
VIAL (ML) INTRAVENOUS CONTINUOUS PRN
Status: DISCONTINUED | OUTPATIENT
Start: 2023-09-19 | End: 2023-09-19

## 2023-09-19 RX ORDER — LIDOCAINE HYDROCHLORIDE 10 MG/ML
INJECTION, SOLUTION EPIDURAL; INFILTRATION; INTRACAUDAL; PERINEURAL
Status: DISCONTINUED | OUTPATIENT
Start: 2023-09-19 | End: 2023-09-19

## 2023-09-19 RX ORDER — PHENYLEPHRINE HCL IN 0.9% NACL 1 MG/10 ML
SYRINGE (ML) INTRAVENOUS
Status: DISCONTINUED
Start: 2023-09-19 | End: 2023-09-19 | Stop reason: HOSPADM

## 2023-09-19 RX ORDER — GLYCOPYRROLATE 0.2 MG/ML
INJECTION INTRAMUSCULAR; INTRAVENOUS
Status: DISCONTINUED
Start: 2023-09-19 | End: 2023-09-19 | Stop reason: HOSPADM

## 2023-09-19 RX ORDER — METOPROLOL TARTRATE 100 MG/1
100 TABLET ORAL 2 TIMES DAILY
COMMUNITY

## 2023-09-19 RX ORDER — ASPIRIN 81 MG/1
81 TABLET ORAL DAILY
COMMUNITY

## 2023-09-19 RX ORDER — EPHEDRINE SULFATE 50 MG/ML
INJECTION, SOLUTION INTRAVENOUS
Status: DISCONTINUED
Start: 2023-09-19 | End: 2023-09-19 | Stop reason: HOSPADM

## 2023-09-19 RX ADMIN — PHENYLEPHRINE HYDROCHLORIDE 50 MCG: 10 INJECTION INTRAVENOUS at 08:09

## 2023-09-19 RX ADMIN — LIDOCAINE HYDROCHLORIDE 50 MG: 10 INJECTION, SOLUTION EPIDURAL; INFILTRATION; INTRACAUDAL; PERINEURAL at 07:09

## 2023-09-19 RX ADMIN — EPHEDRINE SULFATE 10 MG: 50 INJECTION INTRAVENOUS at 08:09

## 2023-09-19 RX ADMIN — PROPOFOL 50 MG: 10 INJECTION, EMULSION INTRAVENOUS at 07:09

## 2023-09-19 RX ADMIN — PROPOFOL 65 MCG/KG/MIN: 10 INJECTION, EMULSION INTRAVENOUS at 07:09

## 2023-09-19 RX ADMIN — SODIUM CHLORIDE, SODIUM GLUCONATE, SODIUM ACETATE, POTASSIUM CHLORIDE AND MAGNESIUM CHLORIDE: 526; 502; 368; 37; 30 INJECTION, SOLUTION INTRAVENOUS at 07:09

## 2023-09-19 NOTE — H&P
[facility]                       [address] [place] [address]    PATIENT NAME:       DEMARCUS JACKSON  YOB: 1945  CSN:                [id number]   MRN:                [id number]  ADMIT DATE:         09/19/2023 06:39:00  PHYSICIAN:          Duy Aguirre MD                        HISTORY AND PHYSICAL      HISTORY OF PRESENT ILLNESS:  78-year-old  male with a personal   history of colon polyps presents now for surveillance colonoscopy.  He denies   any melena, hematochezia, or change in bowel functions.  He has had multiple   colon polyps in the past; however, his last exam in 2018 revealed 1 small   adenomatous polyp.  A 5-year followup was recommended.    ALLERGIES:  NKDA.     MEDICATIONS:  Please see nurse's list.  The patient did discontinue his Eliquis   on Saturday.    PAST MEDICAL HISTORY:  Coronary artery disease, atrial fibrillation, diabetes,   gout, hyperlipidemia, hypertension.    PAST SURGICAL HISTORY:  Hernia repair, left toe amputation, defibrillator   placement.    SOCIAL HISTORY:  Former smoker.  No alcohol.    PHYSICAL EXAMINATION:  VITAL SIGNS:  Stable, afebrile.  GENERAL:  He is an older man in no acute distress.  Large stature.  HEENT EXAM:  Sclerae anicteric.  Conjunctiva pink.  No adenopathy or   thyromegaly.  CARDIOVASCULAR:  Regular rate and rhythm.  LUNGS:  Clear.  ABDOMEN:  Soft, nontender.  Bowel sounds normal.  No mass, organomegaly   appreciated.  EXTREMITIES:  No clubbing, cyanosis, edema.  NEUROLOGIC:  Alert and oriented.  No focal deficits.    IMPRESSION:  Personal history of colon polyps.    RECOMMENDATIONS:  We will proceed with surveillance colonoscopy.        ______________________________  MD TITI Amanda/TIARAS  DD:  09/19/2023  Time:  07:49AM  DT:  09/19/2023  Time:  08:45AM  Job #:  [id number]      HISTORY AND PHYSICAL

## 2023-09-19 NOTE — TRANSFER OF CARE
"Anesthesia Transfer of Care Note    Patient: Darell Hassan    Procedure(s) Performed: Procedure(s) (LRB):  COLON (N/A)    Patient location: GI    Anesthesia Type: general    Transport from OR: Transported from OR on room air with adequate spontaneous ventilation    Post pain: adequate analgesia    Post assessment: no apparent anesthetic complications    Post vital signs: stable    Level of consciousness: awake and sedated    Nausea/Vomiting: no nausea/vomiting    Complications: none    Transfer of care protocol was followed      Last vitals:   Visit Vitals  /72 (BP Location: Right arm, Patient Position: Lying)   Pulse 60   Temp 36.8 °C (98.2 °F) (Oral)   Resp 16   Ht 5' 11" (1.803 m)   Wt 113.4 kg (250 lb)   SpO2 97%   BMI 34.87 kg/m²     "

## 2023-09-19 NOTE — PROVATION PATIENT INSTRUCTIONS
Discharge Summary/Instructions after an Endoscopic Procedure  Patient Name: Darell Hassan  Patient MRN: [id number]  Patient YOB: 1945 Tuesday, September 19, 2023  Duy Aguirre MD  Dear patient,  As a result of recent federal legislation (The Federal Cures Act), you may   receive lab or pathology results from your procedure in your [facility]   account before your physician is able to contact you. Your physician or   their representative will relay the results to you with their   recommendations at their soonest availability.  Thank you,  RESTRICTIONS:  During your procedure today, you received medications for sedation.  These   medications may affect your judgment, balance and coordination.  Therefore,   for 24 hours, you have the following restrictions:   - DO NOT drive a car, operate machinery, make legal/financial decisions,   sign important papers or drink alcohol.    ACTIVITY:  Today: no heavy lifting, straining or running due to procedural   sedation/anesthesia.  The following day: return to full activity including work.  DIET:  Eat and drink normally unless instructed otherwise.     TREATMENT FOR COMMON SIDE EFFECTS:  - Mild abdominal pain, nausea, belching, bloating or excessive gas:  rest,   eat lightly and use a heating pad.  - Sore Throat: treat with throat lozenges and/or gargle with warm salt   water.  - Because air was used during the procedure, expelling large amounts of air   from your rectum or belching is normal.  - If a bowel prep was taken, you may not have a bowel movement for 1-3 days.    This is normal.  SYMPTOMS TO WATCH FOR AND REPORT TO YOUR PHYSICIAN:  1. Abdominal pain or bloating, other than gas cramps.  2. Chest pain.  3. Back pain.  4. Signs of infection such as: chills or fever occurring within 24 hours   after the procedure.  5. Rectal bleeding, which would show as bright red, maroon, or black stools.   (A tablespoon of blood from the rectum is not serious, especially  if   hemorrhoids are present.)  6. Vomiting.  7. Weakness or dizziness.  GO DIRECTLY TO THE NEAREST EMERGENCY ROOM IF YOU HAVE ANY OF THE FOLLOWING:      Difficulty breathing              Chills and/or fever over 101 F   Persistent vomiting and/or vomiting blood   Severe abdominal pain   Severe chest pain   Black, tarry stools   Bleeding- more than one tablespoon   Any other symptom or condition that you feel may need urgent attention  Your doctor recommends these additional instructions:  If any biopsies were taken, your doctors clinic will contact you in 1 to 2   weeks with any results.  - Discharge patient to home (ambulatory).   - Resume previous diet today.   - Await pathology results.   - Repeat colonoscopy in 3 years for surveillance based on clinical status at   that time.   - The findings and recommendations were discussed with the patient.  For questions, problems or results please call your physician - Duy Aguirre MD at Work:  [phone].  [facility] EMERGENCY ROOM PHONE NUMBER: [phone]  IF A COMPLICATION OR EMERGENCY SITUATION ARISES AND YOU ARE UNABLE TO REACH   YOUR PHYSICIAN - GO DIRECTLY TO THE EMERGENCY ROOM.  MD Duy Dougherty MD  9/19/2023 8:45:21 AM  This report has been verified and signed electronically.  Dear patient,  As a result of recent federal legislation (The Federal Cures Act), you may   receive lab or pathology results from your procedure in your [facility]   account before your physician is able to contact you. Your physician or   their representative will relay the results to you with their   recommendations at their soonest availability.  Thank you,  PROVATION   Yes

## 2023-09-19 NOTE — ANESTHESIA POSTPROCEDURE EVALUATION
Anesthesia Post Evaluation    Patient: Darell Hassan    Procedure(s) Performed: Procedure(s) (LRB):  COLON (N/A)    Final Anesthesia Type: general      Patient location during evaluation: GI PACU  Patient participation: Yes- Able to Participate  Level of consciousness: oriented and awake  Post-procedure vital signs: reviewed and stable  Pain management: adequate  Airway patency: patent    PONV status at discharge: No PONV  Anesthetic complications: no      Cardiovascular status: hemodynamically stable  Respiratory status: spontaneous ventilation and unassisted  Hydration status: euvolemic  Follow-up not needed.  Comments: [facility]          Vitals Value Taken Time   /71 09/19/23 0907   Temp 36.8 °C (98.2 °F) 09/19/23 0841   Pulse 58 09/19/23 0907   Resp 19 09/19/23 0907   SpO2 95 % 09/19/23 0907         No case tracking events are documented in the log.      Pain/Janette Score: Janette Score: 10 (9/19/2023  9:07 AM)

## 2023-09-20 VITALS
TEMPERATURE: 98 F | HEIGHT: 71 IN | HEART RATE: 58 BPM | BODY MASS INDEX: 35 KG/M2 | RESPIRATION RATE: 19 BRPM | OXYGEN SATURATION: 95 % | SYSTOLIC BLOOD PRESSURE: 120 MMHG | DIASTOLIC BLOOD PRESSURE: 71 MMHG | WEIGHT: 250 LBS

## 2023-09-20 LAB — PSYCHE PATHOLOGY RESULT: NORMAL

## 2024-01-17 ENCOUNTER — HOSPITAL ENCOUNTER (EMERGENCY)
Facility: HOSPITAL | Age: 79
Discharge: HOME | End: 2024-01-18
Attending: INTERNAL MEDICINE
Payer: MEDICARE
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
## 2024-01-17 DIAGNOSIS — S70.12XA CONTUSION OF LEFT HIP AND THIGH, INITIAL ENCOUNTER: ICD-10-CM
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
## 2024-01-17 DIAGNOSIS — S20.211A CONTUSION OF RIB ON RIGHT SIDE, INITIAL ENCOUNTER: ICD-10-CM

## 2024-01-17 DIAGNOSIS — S70.02XA CONTUSION OF LEFT HIP AND THIGH, INITIAL ENCOUNTER: ICD-10-CM

## 2024-01-17 PROCEDURE — 99284 EMERGENCY DEPT VISIT MOD MDM: CPT

## 2024-01-17 PROCEDURE — 96372 THER/PROPH/DIAG INJ SC/IM: CPT | Performed by: INTERNAL MEDICINE

## 2024-01-17 PROCEDURE — 63600175 PHARM REV CODE 636 W HCPCS: Performed by: INTERNAL MEDICINE

## 2024-01-17 RX ORDER — TRAMADOL HYDROCHLORIDE 50 MG/1
50 TABLET ORAL EVERY 8 HOURS PRN
Qty: 15 TABLET | Refills: 0 | Status: SHIPPED | OUTPATIENT
Start: 2024-01-17 | End: 2024-01-22

## 2024-01-17 RX ORDER — KETOROLAC TROMETHAMINE 30 MG/ML
60 INJECTION, SOLUTION INTRAMUSCULAR; INTRAVENOUS
Status: COMPLETED | OUTPATIENT
Start: 2024-01-17 | End: 2024-01-17

## 2024-01-17 RX ORDER — ORPHENADRINE CITRATE 30 MG/ML
60 INJECTION INTRAMUSCULAR; INTRAVENOUS
Status: COMPLETED | OUTPATIENT
Start: 2024-01-17 | End: 2024-01-17

## 2024-01-17 RX ORDER — CYCLOBENZAPRINE HCL 10 MG
10 TABLET ORAL 3 TIMES DAILY PRN
Qty: 15 TABLET | Refills: 0 | Status: SHIPPED | OUTPATIENT
Start: 2024-01-17 | End: 2024-01-22

## 2024-01-17 RX ORDER — HYDROCODONE BITARTRATE AND ACETAMINOPHEN 10; 325 MG/1; MG/1
1 TABLET ORAL
Status: COMPLETED | OUTPATIENT
Start: 2024-01-18 | End: 2024-01-17

## 2024-01-17 RX ADMIN — HYDROCODONE BITARTRATE AND ACETAMINOPHEN 1 TABLET: 10; 325 TABLET ORAL at 11:01

## 2024-01-17 RX ADMIN — KETOROLAC TROMETHAMINE 60 MG: 30 INJECTION, SOLUTION INTRAMUSCULAR at 10:01

## 2024-01-17 RX ADMIN — ORPHENADRINE CITRATE 60 MG: 60 INJECTION INTRAMUSCULAR; INTRAVENOUS at 10:01

## 2024-01-18 VITALS
HEIGHT: 71 IN | TEMPERATURE: 98 F | WEIGHT: 255 LBS | OXYGEN SATURATION: 99 % | RESPIRATION RATE: 18 BRPM | BODY MASS INDEX: 35.7 KG/M2 | DIASTOLIC BLOOD PRESSURE: 70 MMHG | HEART RATE: 72 BPM | SYSTOLIC BLOOD PRESSURE: 118 MMHG

## 2024-01-18 PROCEDURE — 25000003 PHARM REV CODE 250: Performed by: INTERNAL MEDICINE

## 2024-01-18 NOTE — ED PROVIDER NOTES
Encounter Date: 1/17/2024       History     Chief Complaint   Patient presents with    Fall     Pt reports falling Friday night around 1700, landing on a wooden step. Pt reports left lateral thigh pain and right rib pain. 2 tylenol and 2 ibuprofen around 1930 tonight. Pt denies LOC. Has defibrillator and on Eliquis.      78-year-old black male presents emergency department complaining of left hip pain and right flank pain after falling Friday night.  He took over-the-counter NSAIDs and Tylenol and states it does not seem to be getting better and he is having trouble sleeping secondary to the pain      Review of patient's allergies indicates:  No Known Allergies  Past Medical History:   Diagnosis Date    Abnormal ankle brachial index     Automatic implantable cardiac defibrillator in situ     Cardiomyopathy     Diabetes mellitus     History of cardioversion     Hypertension     Paroxysmal atrial fibrillation     Personal history of colonic polyps     Tricuspid valve insufficiency      Past Surgical History:   Procedure Laterality Date    aicd implantation      COLONOSCOPY      COLONOSCOPY, WITH POLYPECTOMY USING SNARE N/A 9/19/2023    Procedure: COLON;  Surgeon: Duy Aguirre MD;  Location: [facility] ENDOSCOPY;  Service: Gastroenterology;  Laterality: N/A;    CYST REMOVAL      DRAINAGE OF PLEURAL EFFUSION      HERNIA REPAIR      REMOVAL OF ELECTRODE LEAD OF CARDIAC CONTRACTILITY MODULATION SYSTEM       History reviewed. No pertinent family history.  Social History     Tobacco Use    Smoking status: Never    Smokeless tobacco: Never   Substance Use Topics    Alcohol use: Never     Review of Systems   Constitutional: Negative.  Negative for activity change, appetite change, chills, diaphoresis, fatigue, fever and unexpected weight change.   HENT: Negative.  Negative for congestion, dental problem, drooling, ear discharge, ear pain, facial swelling, hearing loss, mouth sores, nosebleeds, postnasal drip,  rhinorrhea, sinus pressure, sinus pain, sneezing, sore throat, tinnitus, trouble swallowing and voice change.    Eyes: Negative.  Negative for photophobia, pain, discharge, redness, itching and visual disturbance.   Respiratory: Negative.  Negative for apnea, cough, choking, chest tightness, shortness of breath, wheezing and stridor.    Cardiovascular: Negative.  Negative for chest pain, palpitations and leg swelling.   Gastrointestinal: Negative.  Negative for abdominal distention, abdominal pain, anal bleeding, blood in stool, constipation, diarrhea, nausea, rectal pain and vomiting.   Endocrine: Negative.  Negative for cold intolerance, heat intolerance, polydipsia, polyphagia and polyuria.   Genitourinary: Negative.  Negative for decreased urine volume, difficulty urinating, dysuria, enuresis, flank pain, frequency, genital sores, hematuria, penile discharge, penile pain, penile swelling, scrotal swelling, testicular pain and urgency.   Musculoskeletal:  Positive for back pain and gait problem. Negative for arthralgias, joint swelling, myalgias, neck pain and neck stiffness.   Skin: Negative.  Negative for color change, pallor, rash and wound.   Allergic/Immunologic: Negative.  Negative for environmental allergies, food allergies and immunocompromised state.   Neurological:  Negative for dizziness, tremors, seizures, syncope, facial asymmetry, speech difficulty, weakness, light-headedness, numbness and headaches.   Hematological: Negative.  Negative for adenopathy. Does not bruise/bleed easily.   Psychiatric/Behavioral: Negative.  Negative for agitation, behavioral problems, confusion, decreased concentration, dysphoric mood, hallucinations, self-injury, sleep disturbance and suicidal ideas. The patient is not nervous/anxious and is not hyperactive.    All other systems reviewed and are negative.      Physical Exam     Initial Vitals [01/17/24 2219]   BP Pulse Resp Temp SpO2   113/69 75 18 97.9 °F (36.6 °C) 98 %       MAP       --         Physical Exam    Nursing note and vitals reviewed.  Constitutional: He appears well-developed and well-nourished.   HENT:   Head: Normocephalic and atraumatic.   Eyes: Conjunctivae and EOM are normal. Pupils are equal, round, and reactive to light.   Neck: Neck supple.   Normal range of motion.  Cardiovascular:  Normal rate and regular rhythm.           Pulmonary/Chest: Breath sounds normal.   Abdominal: Abdomen is soft. Bowel sounds are normal.   Musculoskeletal:         General: Normal range of motion.      Cervical back: Normal range of motion and neck supple.        Back:         Legs:      Neurological: He is alert and oriented to person, place, and time.   Skin: Skin is warm and dry. Capillary refill takes less than 2 seconds.   Psychiatric: He has a normal mood and affect. His behavior is normal. Judgment and thought content normal.         ED Course   Procedures  Labs Reviewed - No data to display       Imaging Results              X-Ray Ribs 2 View Right (Preliminary result)  Result time 01/17/24 23:51:39      Wet Read by Jose Ratliff MD (01/17/24 23:51:39, [facility] - Emergency Dept, Emergency Medicine)    No acute fractures or dislocations noted                                     X-Ray Lumbar Spine Ap And Lateral (Preliminary result)  Result time 01/17/24 23:52:02      Wet Read by Jose Ratliff MD (01/17/24 23:52:02, [facility] - Emergency Dept, Emergency Medicine)    No acute fractures or subluxations noted.  Decreased vertebral space noted between T12 and L1                                     X-Ray Hip 2 or 3 views Left (with Pelvis when performed) (Preliminary result)  Result time 01/17/24 23:51:46      Wet Read by Jose Ratliff MD (01/17/24 23:51:46, [facility] - Emergency Dept, Emergency Medicine)    No acute fractures or dislocations noted                                     Medications   ketorolac injection 60  mg (60 mg Intramuscular Given 1/17/24 2238)   orphenadrine injection 60 mg (60 mg Intramuscular Given 1/17/24 2236)   HYDROcodone-acetaminophen  mg per tablet 1 tablet (1 tablet Oral Given 1/17/24 3687)     Medical Decision Making  78-year-old black male presents emergency department after falling several days ago and continues to have pain in his ribs and hip and thigh.  Differential included fracture versus hematoma versus dislocation versus contusion.  Workup included Toradol and Norflex injection followed by x-rays of the ribs on the right lumbar spine and left hip and thigh.  There were no acute fractures noted on his x-rays and he is getting some relief from the injections but still having some pain.  We discussed I will give him a dose of hydrocodone prior to going home and I will send him home with some tramadol on muscle relaxants and demonstrated some stretches that he needs to do.  He is on Eliquis so discussed that the contusions that he have will take a little longer to resolve and he should follow up with his primary care if the pain continues due to the limitations of x-rays.  Patient and family voiced understanding    Problems Addressed:  Contusion of left hip and thigh, initial encounter: acute illness or injury  Contusion of rib on right side, initial encounter: acute illness or injury  Fall: acute illness or injury  Fall, initial encounter: acute illness or injury    Amount and/or Complexity of Data Reviewed  Independent Historian:      Details: Daughter  Radiology: ordered and independent interpretation performed. Decision-making details documented in ED Course.    Risk  OTC drugs.  Prescription drug management.                                      Clinical Impression:  Final diagnoses:  [W19.XXXA] Fall  [W19.XXXA] Fall, initial encounter (Primary)  [S20.211A] Contusion of rib on right side, initial encounter  [S70.02XA, S70.12XA] Contusion of left hip and thigh, initial encounter           ED Disposition Condition    Discharge Stable          ED Prescriptions       Medication Sig Dispense Start Date End Date Auth. Provider    traMADoL (ULTRAM) 50 mg tablet Take 1 tablet (50 mg total) by mouth every 8 (eight) hours as needed for Pain. 15 tablet 1/17/2024 1/22/2024 Jose Ratliff MD    cyclobenzaprine (FLEXERIL) 10 MG tablet Take 1 tablet (10 mg total) by mouth 3 (three) times daily as needed for Muscle spasms. 15 tablet 1/17/2024 1/22/2024 Jose Ratliff MD          Follow-up Information       Follow up With Specialties Details Why Contact Info    Primary care physician  In 1 week               Jose Ratliff MD  01/18/24 0000

## 2024-05-21 ENCOUNTER — HOSPITAL ENCOUNTER (OUTPATIENT)
Facility: HOSPITAL | Age: 79
Discharge: HOME | End: 2024-05-21
Attending: INTERNAL MEDICINE | Admitting: INTERNAL MEDICINE
Payer: MEDICARE

## 2024-05-21 VITALS
HEART RATE: 60 BPM | HEIGHT: 72 IN | SYSTOLIC BLOOD PRESSURE: 158 MMHG | RESPIRATION RATE: 14 BRPM | WEIGHT: 263 LBS | OXYGEN SATURATION: 97 % | BODY MASS INDEX: 35.62 KG/M2 | DIASTOLIC BLOOD PRESSURE: 83 MMHG

## 2024-05-21 DIAGNOSIS — I49.9 ARRHYTHMIA: ICD-10-CM

## 2024-05-21 DIAGNOSIS — I42.8 CARDIOMYOPATHY, NONISCHEMIC: ICD-10-CM

## 2024-05-21 DIAGNOSIS — I42.8 NONOBSTRUCTIVE CARDIOMYOPATHY: ICD-10-CM

## 2024-05-21 PROBLEM — Z45.02 IMPLANTABLE CARDIOVERTER-DEFIBRILLATOR (ICD) AT END OF BATTERY LIFE: Status: ACTIVE | Noted: 2024-05-21

## 2024-05-21 LAB
OHS QRS DURATION: 92 MS
OHS QRS DURATION: 98 MS
OHS QTC CALCULATION: 342 MS
OHS QTC CALCULATION: 440 MS
POCT GLUCOSE: 153 MG/DL (ref 70–110)

## 2024-05-21 PROCEDURE — 93005 ELECTROCARDIOGRAM TRACING: CPT | Mod: 59

## 2024-05-21 PROCEDURE — 99152 MOD SED SAME PHYS/QHP 5/>YRS: CPT | Performed by: INTERNAL MEDICINE

## 2024-05-21 PROCEDURE — 25000003 PHARM REV CODE 250: Performed by: INTERNAL MEDICINE

## 2024-05-21 PROCEDURE — 27201423 OPTIME MED/SURG SUP & DEVICES STERILE SUPPLY: Performed by: INTERNAL MEDICINE

## 2024-05-21 PROCEDURE — 93010 ELECTROCARDIOGRAM REPORT: CPT | Mod: 76,,, | Performed by: INTERNAL MEDICINE

## 2024-05-21 PROCEDURE — 33263 RMVL & RPLCMT DFB GEN 2 LEAD: CPT | Performed by: INTERNAL MEDICINE

## 2024-05-21 PROCEDURE — C1721 AICD, DUAL CHAMBER: HCPCS | Performed by: INTERNAL MEDICINE

## 2024-05-21 PROCEDURE — 63600175 PHARM REV CODE 636 W HCPCS: Performed by: INTERNAL MEDICINE

## 2024-05-21 DEVICE — IMPLANTABLE CARDIOVERTER DEFIBRILLATOR DR
Type: IMPLANTABLE DEVICE | Site: CHEST | Status: FUNCTIONAL
Brand: VIGILANT™ EL ICD DR

## 2024-05-21 RX ORDER — FINASTERIDE 5 MG/1
5 TABLET, FILM COATED ORAL DAILY
COMMUNITY
Start: 2024-02-28

## 2024-05-21 RX ORDER — LIDOCAINE HYDROCHLORIDE 10 MG/ML
INJECTION INFILTRATION; PERINEURAL
Status: DISCONTINUED | OUTPATIENT
Start: 2024-05-21 | End: 2024-05-21 | Stop reason: HOSPADM

## 2024-05-21 RX ORDER — SODIUM CHLORIDE 9 MG/ML
INJECTION, SOLUTION INTRAVENOUS ONCE
Status: DISCONTINUED | OUTPATIENT
Start: 2024-05-21 | End: 2024-05-21 | Stop reason: HOSPADM

## 2024-05-21 RX ORDER — FENTANYL CITRATE 50 UG/ML
INJECTION, SOLUTION INTRAMUSCULAR; INTRAVENOUS
Status: DISCONTINUED | OUTPATIENT
Start: 2024-05-21 | End: 2024-05-21 | Stop reason: HOSPADM

## 2024-05-21 RX ORDER — MIDAZOLAM HYDROCHLORIDE 1 MG/ML
INJECTION INTRAMUSCULAR; INTRAVENOUS
Status: DISCONTINUED | OUTPATIENT
Start: 2024-05-21 | End: 2024-05-21 | Stop reason: HOSPADM

## 2024-05-21 RX ORDER — HYDROCODONE BITARTRATE AND ACETAMINOPHEN 5; 325 MG/1; MG/1
1 TABLET ORAL EVERY 4 HOURS PRN
Status: DISCONTINUED | OUTPATIENT
Start: 2024-05-21 | End: 2024-05-21 | Stop reason: HOSPADM

## 2024-05-21 RX ORDER — ACETAMINOPHEN 325 MG/1
650 TABLET ORAL EVERY 4 HOURS PRN
Status: DISCONTINUED | OUTPATIENT
Start: 2024-05-21 | End: 2024-05-21 | Stop reason: HOSPADM

## 2024-05-21 RX ORDER — DIPHENHYDRAMINE HYDROCHLORIDE 50 MG/ML
INJECTION INTRAMUSCULAR; INTRAVENOUS
Status: DISCONTINUED | OUTPATIENT
Start: 2024-05-21 | End: 2024-05-21 | Stop reason: HOSPADM

## 2024-05-21 NOTE — Clinical Note
Take escitalopram/Lexapro half a tablet or 5 mg daily for 1 week then stop    And start bupropion in the morning 150 mg in the morning this will help improve concentration, improve mood symptoms/Appetite/help with weight loss.    Follow-up with Dr. Rush in 3 months to follow-up on mood.            Recheck PSA in June 17, 2024 or sooner based upon preference to continue to trend PSA value   continue to monitor    Verify with pharmacy that you received both shingles vaccine series    Repeat colonoscopy anytime after 5/23/2027    Due for follow-up on thoracic aortic aneurysm anytime after February 2025    Due for repeat MRI brain anytime after June 2024 onward      Follow-up with Dr. Rush in 1 year for annual physical.  May have fasting lab work prior to appointment.   Standing order placed.       Patient education: Thoracic aortic aneurysm (The Basics)    What is a thoracic aortic aneurysm?  A \"thoracic aortic aneurysm\" (\"TAA\") is a problem in the aorta. The aorta is the main blood vessel that comes out of the heart. Blood flows through the aorta to the rest of the body.    The \"thoracic aorta\" is the upper part of the aorta. In people with a TAA, a part of the thoracic aorta balloons out or bulges.    A TAA can happen in different parts of the aorta     -\"Ascending\" thoracic aorta - This is the part that comes out of the heart and leads up toward the neck and arms.    -\"Descending\" thoracic aorta - This is the part that leads down toward the belly.    If a TAA bursts, it is very dangerous. A burst aorta causes lots of internal bleeding.    What are the symptoms of a TAA?  Most people with TAAs have no symptoms.     Who is most likely to get a TAA?  Your risk for having a TAA goes up if you:    -Are male    -Smoke    -Have high blood pressure    -Have high cholesterol    -Have family members who have had the condition    -Have another aneurysm, like an \"abdominal aortic aneurysm\" (which affects the lower part of the  The procedural consent was signed. A history and physical note was completed in the chart. aorta) or a brain aneurysm    -Have a problem with the aortic valve, which is the valve that lets blood flow from the heart through the aorta    TAAs are more common in people with certain genetic conditions affecting the connective tissues. (Connective tissues make up and support the skin, bones, blood vessels, and other organs.) If you have one of these conditions, your doctor or nurse will talk to you about your risk of having a TAA. They will also decide if you should have regular testing.    People who have had certain other problems with their aorta are also at higher risk. For example, an \"aortic dissection\" that was not repaired right away can lead to a TAA. An aortic dissection is a tear in the inner wall of the aorta. People with a TAA are also more likely to get an aortic dissection.    Is there a test for TAAs?  Yes. If a doctor thinks that you might have a TAA, they will do an imaging test to look at your thoracic aorta. Imaging tests create pictures of the inside of the body. The most common tests for TAA are CT and MRI scans. They can show if you have a TAA, where it is, and how big it is.    Depending on your situation, you might need other tests, too. For example, if you have a TAA in the ascending aorta, your doctor will want to check how well your heart is working. In some cases, doctors find a TAA when a person gets an imaging test, such as a chest X-ray, for another reason.    How are TAAs treated?  It depends. If your TAA is small and you are not having any symptoms, you might not need treatment to repair it right away.    If you have another condition related to your TAA, your doctor will decide if it needs treatment or tests.    What should I do if I have a TAA but don't need surgery?  You will need to:    -Get regular imaging tests - TAAs get bigger over time. These tests will show how quickly yours is getting bigger. You will get tests every 3 months to 1 year, depending on how big your TAA  is.    -Take medicine to control your heart rate and blood pressure.    -Call your doctor if you start having pain in your chest, back, or belly. Pain can be a sign that the aneurysm is bursting.    People who have a TAA, even a small one, are at increased risk for heart disease. Your doctor can talk to you about ways to help lower your risk. This might include improving your diet, exercising more, and quitting smoking if you smoke.    When should TAAs be repaired?  Repair is the best treatment if your TAA is at risk of bursting. Your doctor can help you understand your risk based on:    --How large your TAA is    -How quickly your TAA is getting bigger    -Your other conditions or health problems    How are TAAs repaired?  It depends on where your TAA is, and how big it is. It also depends on your age, health, and other medical conditions.    The ways to fix a TAA are:    -Traditional \"open\" surgery - If your TAA is in the ascending thoracic aorta (the part closest to the heart), it needs to be repaired with open surgery. For open surgery, the doctor cuts open your chest to get to your aorta. They might cut into your belly if the abdominal aorta is also affected.    Before the surgery, you will get medicines (called anesthesia) to help you relax and then to make you unconscious. The doctor will place clamps on the aorta to stop blood from flowing through. Then, they will replace the bulging part of the aorta with a tube called a \"graft\" (figure 2). The graft is made from a special fabric, and is sewn into place. After the clamps are removed, blood can flow normally through it. If there are problems with other parts of the aorta, its branches, or the heart valves, these can also be fixed during open surgery.    To do open surgery, the doctor might need to stop your heart for a short time. If this happens, a machine called the \"heart-lung machine\" takes over the work of your heart while the aorta is being fixed. The  machine keeps blood flowing throughout your body. After the graft is in place, the doctor restarts your heart and takes you off of the heart-lung machine.    -Endovascular stent-graft - If your TAA is in the descending thoracic aorta (the part that goes toward the belly), you might be able to have an endovascular repair.    To fix the aorta this way, the doctor makes a cut in a blood vessel at the top of the thigh and inserts a folded graft. Then, the doctor threads the graft up to the bulging part of the aorta and unfolds it (figure 3). This type of graft does not need to be sewn into place. Blood flows through the graft.    -Combination (or \"hybrid\") repair - This involves open surgery followed by an endovascular graft. It might be an option in some situations.    What will happen after my TAA repair?  It depends on what kind of repair you had, as well as your age and health.    -If you had open surgery or hybrid repair, you will need to stay in the hospital, probably for at least a week or so. Your doctor will watch you for problems, such as internal bleeding. Other problems that can sometimes happen include stroke, spinal cord injury, problems in the intestines, and liver or kidney failure. Your doctor can tell you how serious your risks for these might be. The risks depend on the type of aneurysm you had and how it was repaired.    Your body will need time to recover after major surgery. Most people don't feel back to normal for at least a couple of months, often longer.    -If you had only an endovascular repair, you will stay in the hospital for a few days. Recovery is much faster with this type of repair than with open surgery. But your doctor will still watch you for problems. In some cases, the endovascular stent-graft can leak or slip out of place.    After repair, you will get imaging tests. For endovascular repairs, it's important to get these tests regularly. This is so your doctor can make sure that  the stent-graft stays in place.    Can TAAs be prevented?  No. But you can greatly reduce your risk by not smoking. If you have high blood pressure, it is also important to control your blood pressure.  Official reprint from [employer]

## 2024-05-21 NOTE — DISCHARGE SUMMARY
[facility] - Cath Lab Services  Discharge Note  Short Stay    Procedure(s) (LRB):  REPLACEMENT, ICD GENERATOR (N/A)      OUTCOME: Patient tolerated treatment/procedure well without complication and is now ready for discharge.    DISPOSITION: Home or Self Care    FINAL DIAGNOSIS:  Implantable cardioverter-defibrillator (ICD) at end of battery life    FOLLOWUP: In clinic    DISCHARGE INSTRUCTIONS:  No discharge procedures on file.     TIME SPENT ON DISCHARGE: 15 minutes

## 2024-05-21 NOTE — Clinical Note
Include Z78.9 (Other Specified Conditions Influencing Health Status) As An Associated Diagnosis?: Yes The chest and neck was prepped. The site was prepped with ChloraPrep. The site was clipped. The patient was draped. Medical Necessity Information: It is in your best interest to select a reason for this procedure from the list below. All of these items fulfill various CMS LCD requirements except the new and changing color options. Add 52 Modifier (Optional): no Post-Care Instructions: I reviewed with the patient in detail post-care instructions. Patient is to wear sunprotection, and avoid picking at any of the treated lesions. Pt may apply Vaseline to crusted or scabbing areas. Detail Level: Simple Consent: The patient's consent was obtained including but not limited to risks of crusting, scabbing, blistering, scarring, darker or lighter pigmentary change, recurrence, incomplete removal and infection. Medical Necessity Clause: This procedure was medically necessary because the lesions that were treated were:

## (undated) DEVICE — ILLUMINATOR PHOTONBLADE 8/11IN

## (undated) DEVICE — CONTAINER SPECIMEN 4OZ

## (undated) DEVICE — ADAPTER DUAL NSL LUER M-M 7FT

## (undated) DEVICE — DRAPE INCISE IOBAN 2 23X17IN

## (undated) DEVICE — ADHESIVE DERMABOND ADVANCED

## (undated) DEVICE — SOL IRRI STRL WATER 1000ML

## (undated) DEVICE — SUT CTD VICRYL PLUS 4/0

## (undated) DEVICE — CANNULA ADULT NASAL 7FT

## (undated) DEVICE — KIT SURGICAL COLON .25 1.1OZ

## (undated) DEVICE — UNDERPAD DISPOSABLE 30X30IN

## (undated) DEVICE — SUT 2-0 VICRYL / CT-1

## (undated) DEVICE — KIT CANIST SUCTION 1200CC

## (undated) DEVICE — COLLECTION SPECIMEN NEPTUNE

## (undated) DEVICE — TIP SUCTION YANKAUER

## (undated) DEVICE — PAD DEFIB RADIOTRANSPARENT

## (undated) DEVICE — SNARE CAPTIFLEX MOF 27X2.4X28

## (undated) DEVICE — Device